# Patient Record
Sex: MALE | Race: BLACK OR AFRICAN AMERICAN | NOT HISPANIC OR LATINO | Employment: OTHER | URBAN - METROPOLITAN AREA
[De-identification: names, ages, dates, MRNs, and addresses within clinical notes are randomized per-mention and may not be internally consistent; named-entity substitution may affect disease eponyms.]

---

## 2020-08-13 PROBLEM — T31.30 BURN (ANY DEGREE) INVOLVING 30-39% OF BODY SURFACE: Status: ACTIVE | Noted: 2020-08-13

## 2020-08-14 PROBLEM — T21.31XA: Status: ACTIVE | Noted: 2020-08-14

## 2020-08-14 PROBLEM — T22.331A: Status: ACTIVE | Noted: 2020-08-14

## 2020-08-14 PROBLEM — T21.24XA: Status: ACTIVE | Noted: 2020-08-14

## 2020-08-14 PROBLEM — T21.21XA: Status: ACTIVE | Noted: 2020-08-14

## 2020-08-14 PROBLEM — T21.34XA THIRD DEGREE BURN OF BACK: Status: ACTIVE | Noted: 2020-08-14

## 2020-08-14 PROBLEM — T24.311A: Status: ACTIVE | Noted: 2020-08-14

## 2020-08-14 PROBLEM — T22.332A: Status: ACTIVE | Noted: 2020-08-14

## 2020-08-17 PROBLEM — E87.6 HYPOKALEMIA: Status: ACTIVE | Noted: 2020-08-17

## 2020-08-18 PROBLEM — E83.51 HYPOCALCEMIA: Status: ACTIVE | Noted: 2020-08-18

## 2020-08-18 PROBLEM — T21.31XA: Status: RESOLVED | Noted: 2020-08-14 | Resolved: 2020-08-18

## 2020-08-21 PROBLEM — E83.51 HYPOCALCEMIA: Status: RESOLVED | Noted: 2020-08-18 | Resolved: 2020-08-21

## 2020-08-21 PROBLEM — E87.6 HYPOKALEMIA: Status: RESOLVED | Noted: 2020-08-17 | Resolved: 2020-08-21

## 2020-08-26 PROBLEM — J96.01 ACUTE RESPIRATORY FAILURE WITH HYPOXIA: Status: ACTIVE | Noted: 2020-08-26

## 2020-08-26 PROBLEM — R00.0 SINUS TACHYCARDIA: Status: ACTIVE | Noted: 2020-08-26

## 2020-08-26 PROBLEM — R50.9 FEVER: Status: ACTIVE | Noted: 2020-08-26

## 2020-08-27 PROBLEM — K56.7 ILEUS: Status: ACTIVE | Noted: 2020-08-27

## 2020-08-29 PROBLEM — E87.0 HYPERNATREMIA: Status: ACTIVE | Noted: 2020-08-29

## 2020-08-29 PROBLEM — K59.03 DRUG-INDUCED CONSTIPATION: Status: ACTIVE | Noted: 2020-08-29

## 2020-09-01 PROBLEM — E87.0 HYPERNATREMIA: Status: RESOLVED | Noted: 2020-08-29 | Resolved: 2020-09-01

## 2020-09-01 PROBLEM — D62 ACUTE BLOOD LOSS ANEMIA: Status: ACTIVE | Noted: 2020-09-01

## 2020-09-01 PROBLEM — E87.6 HYPOKALEMIA: Status: ACTIVE | Noted: 2020-09-01

## 2020-09-01 PROBLEM — K56.7 ILEUS: Status: RESOLVED | Noted: 2020-08-27 | Resolved: 2020-09-01

## 2020-09-02 PROBLEM — E83.39 HYPOPHOSPHATEMIA: Status: ACTIVE | Noted: 2020-09-02

## 2020-09-07 PROBLEM — K59.03 DRUG-INDUCED CONSTIPATION: Status: RESOLVED | Noted: 2020-08-29 | Resolved: 2020-09-07

## 2020-09-12 PROBLEM — R50.9 FEVER: Status: RESOLVED | Noted: 2020-08-26 | Resolved: 2020-09-12

## 2020-09-13 PROBLEM — J96.01 ACUTE RESPIRATORY FAILURE WITH HYPOXIA: Status: RESOLVED | Noted: 2020-08-26 | Resolved: 2020-09-13

## 2020-09-16 PROBLEM — D62 ACUTE BLOOD LOSS ANEMIA: Status: RESOLVED | Noted: 2020-09-01 | Resolved: 2020-09-16

## 2020-09-22 ENCOUNTER — NURSE TRIAGE (OUTPATIENT)
Dept: ADMINISTRATIVE | Facility: CLINIC | Age: 40
End: 2020-09-22

## 2020-09-22 NOTE — TELEPHONE ENCOUNTER
Tried only number found & got no answer, it rang several times, but no way to leave VM.  No further attempts per PP list protocol    Additional Information   Negative: Caller is angry or rude (e.g., hangs up, verbally abusive, yelling)   Negative: Caller hangs up   Negative: Caller has already spoken with the PCP and has no further questions.   Negative: Caller has already spoken with another triager and has no further questions.   Negative: Caller has already spoken with another triager or PCP AND has further questions AND triager able to answer questions.   Negative: Busy signal.  First attempt to contact caller.  Follow-up call scheduled within 15 minutes.   Negative: No answer.  First attempt to contact caller.  Follow-up call scheduled within 15 minutes.   Negative: Message left on identified voice mail   Negative: Message left on unidentified voice mail.  Phone number verified.   Negative: Message left with person in household.   Negative: Wrong number reached.  Phone number verified.   Negative: Second attempt to contact family AND no contact made.  Phone number verified.   Negative: Cell phone out of range.  Phone number verified.   Negative: Pager number given.  Answering service notified.   Negative: Patient already left for the hospital/clinic.   Negative: Caller has cancelled the call before the first contact   Negative: Unable to complete triage due to phone connection issues   Negative: NON-URGENT call redirected to PCP's office because it is open    Protocols used: NO CONTACT OR DUPLICATE CONTACT CALL-A-

## 2020-09-27 ENCOUNTER — NURSE TRIAGE (OUTPATIENT)
Dept: ADMINISTRATIVE | Facility: CLINIC | Age: 40
End: 2020-09-27

## 2020-09-27 NOTE — TELEPHONE ENCOUNTER
Pt contacted through Post Procedural Symptom Tracking. Pt unavailable. Mother Denies any cough, fever, or difficulty breathing since procedure.  Mother hung up prior to pt verification and further instruction. No additional contact required.    Reason for Disposition   [1] Follow-up call to recent contact AND [2] information only call, no triage required     Spoke to mother. Denied pt has had symptoms.    Additional Information   Negative: [1] Caller is not with the adult (patient) AND [2] reporting urgent symptoms   Negative: Lab result questions   Negative: Medication questions   Negative: Caller can't be reached by phone   Negative: Caller has already spoken to PCP or another triager   Negative: RN needs further essential information from caller in order to complete triage   Negative: Requesting regular office appointment   Negative: [1] Caller requesting NON-URGENT health information AND [2] PCP's office is the best resource    Protocols used: INFORMATION ONLY CALL - NO TRIAGE-A-